# Patient Record
(demographics unavailable — no encounter records)

---

## 2025-06-03 NOTE — ASSESSMENT
[FreeTextEntry1] : 59-year-old male here for evaluation status post a right shoulder injury.  Patient reports 2 weeks ago he was handling a pallet jack that which jammed his right shoulder.  He reports pain over the AC joint of his right shoulder since time of injury that which is overall improving.  He reports the pain is only intermittent.  Denies any numbness or tingling.  Denies any instability.  Physical examination of the shoulder: No swelling, ecchymosis, erythema appreciated.  Skin is intact.  No midline neck or paraspinal tenderness.  Mild tenderness of the anterior glenohumeral joint and lateral deltoid.  Mild tenderness over the AC joint.  Good range of motion upon flexion and abduction of the left shoulder without any limitations.  4-5 strength.  Negative Alfredo.  Positive Rudy's.  Negative drop arm testing.  Sensorimotor intact distally.  Neurovascularly intact.   X-rays of the shoulder taken in the office today: No acute fractures, subluxations, or dislocations.  Calcification and osteoarthritic changes appreciated throughout.    Treatment plan as discussed:   My clinical suspicion is high for sprain of the AC joint of the right shoulder given the patient's history, physical examination findings, and x-ray findings.   I recommended anti-inflammatory medication.  Ibuprofen 800 mg sent to patient's pharmacy to be taken as needed for pain.  Confirmed no contraindication to NSAIDs.  Risks and benefits discussed.   Script for physical therapy provided for improved ROM and strengthening of surrounding musculature. Benefits discussed.   I recommended patient rest, ice, compress, and elevate the arm regularly. Encouraged activity modification as tolerable. Encouraged gentle range of motion to avoid stiffness.   All questions and concerns addressed to patient's satisfaction. Patient expresses full understanding of treatment plan. Patient will follow up with Donte or Jorge in 6 weeks for further evaluation and treatment. will consider MRI in repeat evaluation if patient's symptoms do not improve.